# Patient Record
Sex: FEMALE | URBAN - METROPOLITAN AREA
[De-identification: names, ages, dates, MRNs, and addresses within clinical notes are randomized per-mention and may not be internally consistent; named-entity substitution may affect disease eponyms.]

---

## 2018-10-11 ENCOUNTER — OFFICE VISIT (OUTPATIENT)
Dept: URBAN - METROPOLITAN AREA CLINIC 76 | Facility: CLINIC | Age: 68
End: 2018-10-11
Payer: COMMERCIAL

## 2018-10-11 PROCEDURE — 92014 COMPRE OPH EXAM EST PT 1/>: CPT | Performed by: OPTOMETRIST

## 2018-10-11 PROCEDURE — 92015 DETERMINE REFRACTIVE STATE: CPT | Performed by: OPTOMETRIST

## 2018-10-11 ASSESSMENT — INTRAOCULAR PRESSURE
OS: 16
OD: 15

## 2018-10-11 ASSESSMENT — VISUAL ACUITY
OS: 20/20
OD: 20/20

## 2018-10-11 ASSESSMENT — KERATOMETRY
OD: 46.00
OS: 46.38

## 2018-10-11 NOTE — IMPRESSION/PLAN
Impression: Presbyopia: H52.4 OU. Plan: Glasses Rx update given. Recommend UV protection. Discussed adaptation, pt understands.

## 2018-10-11 NOTE — IMPRESSION/PLAN
Impression: Age-related nuclear cataract, bilateral: H25.13. OU. Plan: Discussed how cataracts may account for some of the vision concerns/complaints/Rx changes. Surgical treatment not required at this time. The patient will monitor vision changes and contact us with any decrease in vision or vision concerns, otherwise RTC as recommended for us to monitor. New glasses option discussed, recommend UV protection. Pt understands.

## 2021-03-24 ENCOUNTER — OFFICE VISIT (OUTPATIENT)
Dept: URBAN - METROPOLITAN AREA CLINIC 81 | Facility: CLINIC | Age: 71
End: 2021-03-24
Payer: COMMERCIAL

## 2021-03-24 PROCEDURE — 99214 OFFICE O/P EST MOD 30 MIN: CPT | Performed by: OPTOMETRIST

## 2021-03-24 PROCEDURE — 92134 CPTRZ OPH DX IMG PST SGM RTA: CPT | Performed by: OPTOMETRIST

## 2021-03-24 ASSESSMENT — VISUAL ACUITY: OS: 20/100

## 2021-03-24 ASSESSMENT — KERATOMETRY
OD: 46.25
OS: 46.50

## 2021-03-24 ASSESSMENT — INTRAOCULAR PRESSURE
OS: 14
OD: 14

## 2021-03-24 NOTE — IMPRESSION/PLAN
Impression: Nonexudative age-related macular degeneration, bilateral, intermediate dry stage: H35.3132. Plan: Macular degeneration, with decreased vision OS. Order OCT macula, OD drusen and OS drusen formation, IRF? Education on dry versus wet ARMD. Dispensed pamphlet on ARMD and Amsler grid. Patient advised to RTC immediately if any vision changes occur. Refer to retina specialist, patient elects to wait for N/A visit in [place], patient feels she is able to go to [place] due to transportation.

## 2021-04-19 ENCOUNTER — OFFICE VISIT (OUTPATIENT)
Dept: URBAN - METROPOLITAN AREA CLINIC 76 | Facility: CLINIC | Age: 71
End: 2021-04-19
Payer: COMMERCIAL

## 2021-04-19 DIAGNOSIS — H35.3131 BILATERAL NONEXUDATIVE AGE-RELATED MACULAR DEGENERATION, EARLY DRY STAGE: Primary | ICD-10-CM

## 2021-04-19 PROCEDURE — 99204 OFFICE O/P NEW MOD 45 MIN: CPT | Performed by: OPHTHALMOLOGY

## 2021-04-19 PROCEDURE — 92134 CPTRZ OPH DX IMG PST SGM RTA: CPT | Performed by: OPHTHALMOLOGY

## 2021-04-19 ASSESSMENT — INTRAOCULAR PRESSURE
OD: 11
OS: 12

## 2021-04-19 NOTE — IMPRESSION/PLAN
Impression: Bilateral nonexudative age-related macular degeneration, early dry stage: H35.3131. Plan: OCT ordered and performed today. Discussed diagnosis with patient, OCT demonstrates the lack of SRF or CME. Additional treatment is not recommended at this time but we will continue to monitor frequently. The patient was advised to continue AREDS multi-vitamins, monitor the amsler grid closely, monitor vision one eye at a time. Call immediately with any vision changes. Patient agrees with plan.

## 2021-04-28 ENCOUNTER — OFFICE VISIT (OUTPATIENT)
Dept: URBAN - METROPOLITAN AREA CLINIC 81 | Facility: CLINIC | Age: 71
End: 2021-04-28
Payer: COMMERCIAL

## 2021-04-28 DIAGNOSIS — H25.13 AGE-RELATED NUCLEAR CATARACT, BILATERAL: Chronic | ICD-10-CM

## 2021-04-28 DIAGNOSIS — H52.4 PRESBYOPIA: Primary | Chronic | ICD-10-CM

## 2021-04-28 PROCEDURE — 92012 INTRM OPH EXAM EST PATIENT: CPT | Performed by: OPTOMETRIST

## 2021-04-28 ASSESSMENT — VISUAL ACUITY
OD: 20/25
OS: 20/200

## 2021-04-28 ASSESSMENT — KERATOMETRY
OS: 46.50
OD: 46.25

## 2021-08-09 ENCOUNTER — OFFICE VISIT (OUTPATIENT)
Dept: URBAN - METROPOLITAN AREA CLINIC 76 | Facility: CLINIC | Age: 71
End: 2021-08-09
Payer: COMMERCIAL

## 2021-08-09 DIAGNOSIS — H35.3132 NONEXUDATIVE AGE-RELATED MACULAR DEGENERATION, BILATERAL, INTERMEDIATE DRY STAGE: Primary | ICD-10-CM

## 2021-08-09 PROCEDURE — 99213 OFFICE O/P EST LOW 20 MIN: CPT | Performed by: OPHTHALMOLOGY

## 2021-08-09 PROCEDURE — 92134 CPTRZ OPH DX IMG PST SGM RTA: CPT | Performed by: OPHTHALMOLOGY

## 2021-08-09 ASSESSMENT — INTRAOCULAR PRESSURE
OD: 14
OS: 13

## 2021-08-09 NOTE — IMPRESSION/PLAN
Impression: Nonexudative age-related macular degeneration, bilateral, intermediate dry stage: H35.3132. Bilateral. Plan: OCT ordered and performed today. Discussed diagnosis with patient, OCT demonstrates the lack of SRF or CME. Treatment is not recommended at this time but we will continue to monitor frequently. The patient was advised to continue AREDS multi-vitamins, monitor the amsler grid closely, monitor vision one eye at a time. Call immediately with any vision changes. Patient agrees with plan.

## 2021-08-19 ENCOUNTER — TESTING ONLY (OUTPATIENT)
Dept: URBAN - METROPOLITAN AREA CLINIC 81 | Facility: CLINIC | Age: 71
End: 2021-08-19
Payer: COMMERCIAL

## 2021-08-19 ASSESSMENT — KERATOMETRY
OD: 46.00
OS: 46.75

## 2021-08-19 NOTE — IMPRESSION/PLAN
Impression: Presbyopia: H52.4. Plan: REFRACTION ONLY, dispensed Rx for computer bifocals, trialed in office.

## 2021-12-13 ENCOUNTER — OFFICE VISIT (OUTPATIENT)
Dept: URBAN - METROPOLITAN AREA CLINIC 76 | Facility: CLINIC | Age: 71
End: 2021-12-13
Payer: COMMERCIAL

## 2021-12-13 DIAGNOSIS — H35.3111 NEXDTVE AGE-RELATED MCLR DEGN, RIGHT EYE, EARLY DRY STAGE: ICD-10-CM

## 2021-12-13 DIAGNOSIS — H35.3222 EXDTVE AGE-REL MCLR DEGN, LEFT EYE, WITH INACT CHRDL NEOVAS: Primary | ICD-10-CM

## 2021-12-13 PROCEDURE — 99213 OFFICE O/P EST LOW 20 MIN: CPT | Performed by: OPHTHALMOLOGY

## 2021-12-13 PROCEDURE — 92134 CPTRZ OPH DX IMG PST SGM RTA: CPT | Performed by: OPHTHALMOLOGY

## 2021-12-13 ASSESSMENT — INTRAOCULAR PRESSURE
OS: 15
OD: 14

## 2021-12-13 NOTE — IMPRESSION/PLAN
Impression: Exdtve age-rel mclr degn, left eye, with inact chrdl neovas: H35.3222. Plan: OCT ordered and performed today. Discussed diagnosis with patient, Additional treatment is not recommended at this time but we will continue to monitor frequently. The patient was advised to continue AREDS multi-vitamins, monitor vision one eye at a time. Call immediately with any vision changes. Patient agrees with plan.

## 2021-12-13 NOTE — IMPRESSION/PLAN
Impression: Nexdtve age-related mclr degn, right eye, early dry stage: H35.3111. Plan: OCT ordered and performed today. Discussed diagnosis with patient, Additional treatment is not recommended at this time but we will continue to monitor frequently. The patient was advised to continue AREDS multi-vitamins, monitor vision one eye at a time. Call immediately with any vision changes. Patient agrees with plan.

## 2022-11-30 ENCOUNTER — OFFICE VISIT (OUTPATIENT)
Dept: URBAN - METROPOLITAN AREA CLINIC 81 | Facility: CLINIC | Age: 72
End: 2022-11-30
Payer: MEDICARE

## 2022-11-30 DIAGNOSIS — H35.3112 NONEXUDATIVE AGE-RELATED MACULAR DEGENERATION OF RIGHT EYE, INTERMEDIATE DRY STAGE: ICD-10-CM

## 2022-11-30 DIAGNOSIS — H35.3220 EXUDATIVE AGE-RELATED MACULAR DEGENERATION, LEFT EYE, STAGE UNSPECIFIED: Primary | ICD-10-CM

## 2022-11-30 DIAGNOSIS — H25.13 AGE-RELATED NUCLEAR CATARACT, BILATERAL: ICD-10-CM

## 2022-11-30 PROCEDURE — 92134 CPTRZ OPH DX IMG PST SGM RTA: CPT | Performed by: OPTOMETRIST

## 2022-11-30 PROCEDURE — 99214 OFFICE O/P EST MOD 30 MIN: CPT | Performed by: OPTOMETRIST

## 2022-11-30 ASSESSMENT — VISUAL ACUITY
OS: CF 3FT
OD: 20/20

## 2022-11-30 ASSESSMENT — INTRAOCULAR PRESSURE
OS: 12
OD: 12

## 2022-11-30 NOTE — IMPRESSION/PLAN
Impression: Age-related nuclear cataract, bilateral: H25.13. Plan: Discussed. Continue to observe. No treatment currently recommended as cataracts do not affect the patients day to day life. Patient to monitor for vision changes and if vision significantly worsens, advised to RTC for evaluation.

## 2022-11-30 NOTE — IMPRESSION/PLAN
Impression: Exudative age-related macular degeneration, left eye, stage unspecified: H35.3220.

-11/30/2022 order OCT mac, reviewed today Plan: Macular degeneration, wet type, appears progressive. Will refer the patient to a retinal specialist for consult.

## 2022-11-30 NOTE — IMPRESSION/PLAN
Impression: Nonexudative age-related macular degeneration of right eye, intermediate dry stage: H35.3112. Plan: Macular degeneration, dry type with stable vision OD. Education on dry versus wet ARMD. AREDS 2 PO may slow progression. Patient advised to RTC immediately if any vision changes occur.

## 2022-12-30 ENCOUNTER — OFFICE VISIT (OUTPATIENT)
Facility: LOCATION | Age: 72
End: 2022-12-30
Payer: MEDICARE

## 2022-12-30 DIAGNOSIS — H25.13 AGE-RELATED NUCLEAR CATARACT, BILATERAL: ICD-10-CM

## 2022-12-30 DIAGNOSIS — H35.3112 NONEXUDATIVE AGE-RELATED MACULAR DEGENERATION OF RIGHT EYE, INTERMEDIATE DRY STAGE: ICD-10-CM

## 2022-12-30 DIAGNOSIS — H35.3221 EXUDATIVE AGE-RELATED MACULAR DEGENERATION, LEFT EYE, WITH ACTIVE CHOROIDAL NEOVASCULARIZATION: Primary | ICD-10-CM

## 2022-12-30 PROCEDURE — 99202 OFFICE O/P NEW SF 15 MIN: CPT | Performed by: OPHTHALMOLOGY

## 2022-12-30 PROCEDURE — 92134 CPTRZ OPH DX IMG PST SGM RTA: CPT | Performed by: OPHTHALMOLOGY

## 2022-12-30 ASSESSMENT — INTRAOCULAR PRESSURE
OD: 12
OS: 17

## 2022-12-30 NOTE — IMPRESSION/PLAN
Impression: Nonexudative age-related macular degeneration of right eye, intermediate dry stage: H35.3112. Plan: Discussed diagnosis with patient. No smoking. Pt to take vitamins approved in the AREDS2 study & continue to monitor AG on a daily basis. If any changes w/ AG call and RTC ASAP. May benefit from treatment of atrophy with future agents, once FDA-approved and available. D/w patient.

## 2022-12-30 NOTE — IMPRESSION/PLAN
Impression: Exudative age-related macular degeneration, left eye, with active choroidal neovascularization: H35.3221. OCT OU - no IRF/SRF OD, CNV with SRF OS  / 374 Plan: Active today Last treatment with deBeus in 2021 Limited VA prog RBA's d/w patient - she would like to consider treatment, but defers today - her  is being evaluated for liver transplant
call ASAP with changes 1m OCT OU re eval Avastin OS

## 2023-02-10 ENCOUNTER — OFFICE VISIT (OUTPATIENT)
Dept: URBAN - METROPOLITAN AREA CLINIC 76 | Facility: CLINIC | Age: 73
End: 2023-02-10
Payer: MEDICARE

## 2023-02-10 DIAGNOSIS — H35.3112 NONEXUDATIVE AGE-RELATED MACULAR DEGENERATION, RIGHT EYE, INTERMEDIATE DRY STAGE: Primary | ICD-10-CM

## 2023-02-10 DIAGNOSIS — H35.3221 EXUDATIVE AGE-RELATED MACULAR DEGENERATION, LEFT EYE, WITH ACTIVE CHOROIDAL NEOVASCULARIZATION: ICD-10-CM

## 2023-02-10 PROCEDURE — 92134 CPTRZ OPH DX IMG PST SGM RTA: CPT | Performed by: OPHTHALMOLOGY

## 2023-02-10 PROCEDURE — 99214 OFFICE O/P EST MOD 30 MIN: CPT | Performed by: OPHTHALMOLOGY

## 2023-02-10 ASSESSMENT — INTRAOCULAR PRESSURE
OD: 12
OS: 12

## 2023-02-10 NOTE — IMPRESSION/PLAN
Impression: Nonexudative age-related macular degeneration, right eye, intermediate dry stage: H35.3112. Plan: Broad low-lying PED, high risk for development of wet AMD. Low threshold to start treatment w/ CNV development and/or PED increased volume. DAILY Amsler grid checks.  RTC 3 months DFEx/OCT mac/poss inj.

## 2023-02-10 NOTE — IMPRESSION/PLAN
Impression: Exudative age-related macular degeneration, left eye, with active choroidal neovascularization: H36.6999. Plan: Advanced Va loss w/ CF. Observe, treat-as-needed. RTC 3 months. AREDS 2 and Amsler grid checks.

## 2023-09-05 ENCOUNTER — OFFICE VISIT (OUTPATIENT)
Dept: URBAN - METROPOLITAN AREA CLINIC 81 | Facility: CLINIC | Age: 73
End: 2023-09-05
Payer: COMMERCIAL

## 2023-09-05 DIAGNOSIS — H52.4 PRESBYOPIA: Primary | ICD-10-CM

## 2023-09-05 PROCEDURE — 92014 COMPRE OPH EXAM EST PT 1/>: CPT | Performed by: OPTOMETRIST

## 2023-09-05 ASSESSMENT — KERATOMETRY
OD: 46.00
OS: 46.50

## 2023-09-05 ASSESSMENT — VISUAL ACUITY
OS: 20/100
OD: 20/20

## 2023-09-05 ASSESSMENT — INTRAOCULAR PRESSURE
OS: 14
OD: 13

## 2023-12-06 ENCOUNTER — OFFICE VISIT (OUTPATIENT)
Dept: URBAN - METROPOLITAN AREA CLINIC 81 | Facility: CLINIC | Age: 73
End: 2023-12-06
Payer: MEDICARE

## 2023-12-06 DIAGNOSIS — H25.13 AGE-RELATED NUCLEAR CATARACT, BILATERAL: ICD-10-CM

## 2023-12-06 DIAGNOSIS — H35.3112 NONEXUDATIVE AGE-RELATED MACULAR DEGENERATION, RIGHT EYE, INTERMEDIATE DRY STAGE: ICD-10-CM

## 2023-12-06 PROCEDURE — 99214 OFFICE O/P EST MOD 30 MIN: CPT | Performed by: OPTOMETRIST

## 2023-12-06 PROCEDURE — 92134 CPTRZ OPH DX IMG PST SGM RTA: CPT | Performed by: OPTOMETRIST

## 2023-12-06 ASSESSMENT — INTRAOCULAR PRESSURE
OS: 15
OD: 13

## 2024-02-05 ENCOUNTER — OFFICE VISIT (OUTPATIENT)
Dept: URBAN - METROPOLITAN AREA CLINIC 76 | Facility: CLINIC | Age: 74
End: 2024-02-05
Payer: MEDICARE

## 2024-02-05 DIAGNOSIS — H35.3221 EXUDATIVE AGE-RELATED MACULAR DEGENERATION, LEFT EYE, WITH ACTIVE CHOROIDAL NEOVASCULARIZATION: Primary | ICD-10-CM

## 2024-02-05 PROCEDURE — 92134 CPTRZ OPH DX IMG PST SGM RTA: CPT | Performed by: OPHTHALMOLOGY

## 2024-02-05 PROCEDURE — 99214 OFFICE O/P EST MOD 30 MIN: CPT | Performed by: OPHTHALMOLOGY

## 2024-02-05 ASSESSMENT — INTRAOCULAR PRESSURE
OS: 12
OD: 12